# Patient Record
Sex: FEMALE | Race: BLACK OR AFRICAN AMERICAN | NOT HISPANIC OR LATINO | Employment: UNEMPLOYED | URBAN - METROPOLITAN AREA
[De-identification: names, ages, dates, MRNs, and addresses within clinical notes are randomized per-mention and may not be internally consistent; named-entity substitution may affect disease eponyms.]

---

## 2017-01-03 ENCOUNTER — OFFICE VISIT (OUTPATIENT)
Dept: OBSTETRICS AND GYNECOLOGY | Facility: CLINIC | Age: 42
End: 2017-01-03
Payer: MEDICAID

## 2017-01-03 VITALS
WEIGHT: 233 LBS | SYSTOLIC BLOOD PRESSURE: 134 MMHG | BODY MASS INDEX: 38.82 KG/M2 | DIASTOLIC BLOOD PRESSURE: 70 MMHG | HEIGHT: 65 IN

## 2017-01-03 DIAGNOSIS — N90.89 VULVAR IRRITATION: Primary | ICD-10-CM

## 2017-01-03 DIAGNOSIS — N89.8 VAGINAL DISCHARGE: ICD-10-CM

## 2017-01-03 PROCEDURE — 99213 OFFICE O/P EST LOW 20 MIN: CPT | Mod: S$PBB,,, | Performed by: OBSTETRICS & GYNECOLOGY

## 2017-01-03 PROCEDURE — 99214 OFFICE O/P EST MOD 30 MIN: CPT | Mod: PBBFAC | Performed by: OBSTETRICS & GYNECOLOGY

## 2017-01-03 PROCEDURE — 87480 CANDIDA DNA DIR PROBE: CPT

## 2017-01-03 PROCEDURE — 99999 PR PBB SHADOW E&M-EST. PATIENT-LVL IV: CPT | Mod: PBBFAC,,, | Performed by: OBSTETRICS & GYNECOLOGY

## 2017-01-03 RX ORDER — CLOTRIMAZOLE AND BETAMETHASONE DIPROPIONATE 10; .64 MG/G; MG/G
CREAM TOPICAL
Qty: 45 G | Refills: 1 | Status: SHIPPED | OUTPATIENT
Start: 2017-01-03 | End: 2018-01-03

## 2017-01-03 NOTE — PROGRESS NOTES
CC:  Vulvar pain    HPI:  41 yro  with supracervical hysterectomy presents due to several months of worsening vulvar pain.  Pt states when the pain flares, it is like she is being poked with needles.          Physical Exam   Constitutional: She is oriented to person, place, and time. She appears well-developed and well-nourished. No distress.   HENT:   Head: Normocephalic and atraumatic.   Neck: Normal range of motion.   Cardiovascular: Normal rate.    Pulmonary/Chest: Effort normal.   Genitourinary:       Pelvic exam was performed with patient supine. No labial fusion. There is tenderness on the right labia. There is no rash, lesion or injury on the right labia. There is tenderness on the left labia. There is no rash, lesion or injury on the left labia. No erythema, tenderness or bleeding in the vagina. No foreign body in the vagina. No signs of injury around the vagina. Vaginal discharge found.   Neurological: She is alert and oriented to person, place, and time. No cranial nerve deficit. Coordination normal.   Skin: She is not diaphoretic.   Psychiatric: She has a normal mood and affect. Her behavior is normal. Judgment normal.     Assessment  Vulvar Irritation  Vaginal discharge    Plan  AFFIRM collected  Rx Lotrisone cream to affected area bid x 14 days

## 2017-01-03 NOTE — MR AVS SNAPSHOT
[facility]  [address], [address]  [place] [address]  Phone: [phone]                  Brissa Falcon   1/3/2017 1:50 PM   Office Visit    Description:  Female : 1975   Provider:  Duncan Steele MD   Department:  [facility]           Reason for Visit     Vaginal Discharge                To Do List           Goals (5 Years of Data)     None      [facility]     [facility] Care Line -  Assistance  Registered nurses in the [facility] provide clinical advisement, health education, appointment booking, and other advisory services.  Call for this free service at [phone].             Medications           Message regarding Medications     Verify the changes and/or additions to your medication regime listed below are the same as discussed with your clinician today.  If any of these changes or additions are incorrect, please notify your healthcare provider.             Verify that the below list of medications is an accurate representation of the medications you are currently taking.  If none reported, the list may be blank. If incorrect, please contact your healthcare provider. Carry this list with you in case of emergency.           Current Medications     alprazolam (XANAX) 0.5 MG tablet Take 0.5 mg by mouth 3 (three) times daily.    buPROPion (WELLBUTRIN XL) 150 MG TB24 tablet Take 150 mg by mouth every morning.    escitalopram oxalate (LEXAPRO) 20 MG tablet Take 20 mg by mouth once daily.    fluticasone (FLONASE) 50 mcg/actuation nasal spray     hydrochlorothiazide (HYDRODIURIL) 25 MG tablet Take 25 mg by mouth once daily.      metoprolol succinate (TOPROL-XL) 100 MG 24 hr tablet Take 50 mg by mouth 2 (two) times daily.    metoprolol succinate (TOPROL-XL) 50 MG 24 hr tablet Take 50 mg by mouth 2 (two) times daily.    phentermine (ADIPEX-P) 37.5 mg tablet Take 18.75 mg by mouth once daily.    temazepam (RESTORIL) 7.5 MG Cap Take  "15 mg by mouth nightly as needed.    topiramate (TOPAMAX) 50 MG tablet Take 50 mg by mouth once daily.    cephALEXin (KEFLEX) 500 MG capsule     cetirizine (ZYRTEC) 10 MG tablet Take 10 mg by mouth once daily.    clonazePAM (KLONOPIN) 1 MG tablet Take 1 mg by mouth 2 (two) times daily.    doxycycline (MONODOX) 100 MG capsule     etodolac (LODINE) 200 MG Cap     gabapentin (NEURONTIN) 100 mg tablet Take 3 tablets (300 mg total) by mouth every evening. 100 mg hs x 3d. 200 mg hs x 3.then 300 mg hs.    hydrocodone-acetaminophen 10-325mg (NORCO)  mg Tab Take 1 tablet by mouth every 4 (four) hours as needed.    hydrOXYzine HCl (ATARAX) 25 MG tablet     ibuprofen (ADVIL,MOTRIN) 800 MG tablet Take 1 tablet (800 mg total) by mouth 3 (three) times daily.    levothyroxine (SYNTHROID) 25 MCG tablet     mupirocin (BACTROBAN) 2 % ointment Apply topically once daily. Apply to affected area as directed    omeprazole (PRILOSEC) 20 MG capsule Take 20 mg by mouth once daily.    ondansetron (ZOFRAN) 4 MG tablet Take 1 tablet (4 mg total) by mouth every 12 (twelve) hours as needed for Nausea.    sulfamethoxazole-trimethoprim 800-160mg (BACTRIM DS) 800-160 mg Tab            Clinical Reference Information           Vital Signs - Last Recorded  Most recent update: 1/3/2017  2:29 PM by Aury Guerra MA    BP Ht Wt LMP BMI    134/70 5' 5" (1.651 m) 105.7 kg (233 lb) 04/18/2015 38.77 kg/m2      Blood Pressure          Most Recent Value    BP  134/70      Allergies as of 1/3/2017     Percocet [Oxycodone-acetaminophen]    Augmentin [Amoxicillin-pot Clavulanate]      Immunizations Administered on Date of Encounter - 1/3/2017     None      "

## 2017-01-04 LAB
CANDIDA RRNA VAG QL PROBE: NEGATIVE
G VAGINALIS RRNA GENITAL QL PROBE: NEGATIVE
T VAGINALIS RRNA GENITAL QL PROBE: NEGATIVE

## 2017-09-30 ENCOUNTER — HOSPITAL ENCOUNTER (EMERGENCY)
Facility: HOSPITAL | Age: 42
Discharge: HOME | End: 2017-09-30
Attending: EMERGENCY MEDICINE
Payer: MEDICAID

## 2017-09-30 ENCOUNTER — NURSE TRIAGE (OUTPATIENT)
Dept: ADMINISTRATIVE | Facility: CLINIC | Age: 42
End: 2017-09-30

## 2017-09-30 VITALS
DIASTOLIC BLOOD PRESSURE: 69 MMHG | SYSTOLIC BLOOD PRESSURE: 109 MMHG | TEMPERATURE: 99 F | BODY MASS INDEX: 40.97 KG/M2 | HEART RATE: 75 BPM | HEIGHT: 64 IN | RESPIRATION RATE: 18 BRPM | WEIGHT: 240 LBS | OXYGEN SATURATION: 98 %

## 2017-09-30 DIAGNOSIS — R07.9 CHEST PAIN: ICD-10-CM

## 2017-09-30 DIAGNOSIS — E87.6 HYPOKALEMIA: Primary | ICD-10-CM

## 2017-09-30 LAB
BASOPHILS # BLD AUTO: 0.01 K/UL
BASOPHILS NFR BLD: 0.2 %
DIFFERENTIAL METHOD: NORMAL
EOSINOPHIL # BLD AUTO: 0 K/UL
EOSINOPHIL NFR BLD: 0.6 %
ERYTHROCYTE [DISTWIDTH] IN BLOOD BY AUTOMATED COUNT: 13.3 %
HCT VFR BLD AUTO: 39.2 %
HGB BLD-MCNC: 13.4 G/DL
LIPASE SERPL-CCNC: 21 U/L
LYMPHOCYTES # BLD AUTO: 1.1 K/UL
LYMPHOCYTES NFR BLD: 20.9 %
MCH RBC QN AUTO: 28.6 PG
MCHC RBC AUTO-ENTMCNC: 34.2 G/DL
MCV RBC AUTO: 84 FL
MONOCYTES # BLD AUTO: 0.5 K/UL
MONOCYTES NFR BLD: 10.1 %
NEUTROPHILS # BLD AUTO: 3.6 K/UL
NEUTROPHILS NFR BLD: 67.8 %
PLATELET # BLD AUTO: 201 K/UL
PMV BLD AUTO: 10 FL
RBC # BLD AUTO: 4.69 M/UL
WBC # BLD AUTO: 5.36 K/UL

## 2017-09-30 PROCEDURE — 63600175 PHARM REV CODE 636 W HCPCS: Performed by: EMERGENCY MEDICINE

## 2017-09-30 PROCEDURE — 99284 EMERGENCY DEPT VISIT MOD MDM: CPT | Mod: ,,, | Performed by: EMERGENCY MEDICINE

## 2017-09-30 PROCEDURE — 96361 HYDRATE IV INFUSION ADD-ON: CPT

## 2017-09-30 PROCEDURE — 93005 ELECTROCARDIOGRAM TRACING: CPT

## 2017-09-30 PROCEDURE — 96374 THER/PROPH/DIAG INJ IV PUSH: CPT

## 2017-09-30 PROCEDURE — 99284 EMERGENCY DEPT VISIT MOD MDM: CPT | Mod: 25

## 2017-09-30 PROCEDURE — 83690 ASSAY OF LIPASE: CPT

## 2017-09-30 PROCEDURE — 85025 COMPLETE CBC W/AUTO DIFF WBC: CPT

## 2017-09-30 PROCEDURE — 93010 ELECTROCARDIOGRAM REPORT: CPT | Mod: ,,, | Performed by: INTERNAL MEDICINE

## 2017-09-30 PROCEDURE — 25000003 PHARM REV CODE 250: Performed by: EMERGENCY MEDICINE

## 2017-09-30 RX ORDER — POTASSIUM CHLORIDE 750 MG/1
20 TABLET, EXTENDED RELEASE ORAL DAILY
Qty: 6 TABLET | Refills: 0 | Status: SHIPPED | OUTPATIENT
Start: 2017-09-30 | End: 2017-10-03

## 2017-09-30 RX ORDER — FLUOXETINE HYDROCHLORIDE 20 MG/1
20 CAPSULE ORAL DAILY
COMMUNITY
End: 2021-10-15

## 2017-09-30 RX ORDER — BUTALBITAL, ACETAMINOPHEN AND CAFFEINE 300; 40; 50 MG/1; MG/1; MG/1
CAPSULE ORAL
COMMUNITY
Start: 2017-07-04 | End: 2021-10-15

## 2017-09-30 RX ORDER — TIZANIDINE 4 MG/1
4 TABLET ORAL NIGHTLY
COMMUNITY
Start: 2017-07-22 | End: 2017-12-13 | Stop reason: ALTCHOICE

## 2017-09-30 RX ORDER — POTASSIUM CHLORIDE 20 MEQ/1
60 TABLET, EXTENDED RELEASE ORAL
Status: COMPLETED | OUTPATIENT
Start: 2017-09-30 | End: 2017-09-30

## 2017-09-30 RX ORDER — BUPROPION HYDROCHLORIDE 300 MG/1
TABLET ORAL
COMMUNITY
Start: 2017-07-19 | End: 2021-10-15

## 2017-09-30 RX ORDER — KETOROLAC TROMETHAMINE 30 MG/ML
15 INJECTION, SOLUTION INTRAMUSCULAR; INTRAVENOUS
Status: COMPLETED | OUTPATIENT
Start: 2017-09-30 | End: 2017-09-30

## 2017-09-30 RX ADMIN — KETOROLAC TROMETHAMINE 15 MG: 30 INJECTION, SOLUTION INTRAMUSCULAR; INTRAVENOUS at 09:09

## 2017-09-30 RX ADMIN — POTASSIUM CHLORIDE 60 MEQ: 1500 TABLET, EXTENDED RELEASE ORAL at 10:09

## 2017-09-30 RX ADMIN — SODIUM CHLORIDE 1000 ML: 0.9 INJECTION, SOLUTION INTRAVENOUS at 09:09

## 2017-10-01 NOTE — ED NOTES
LOC: The patient is awake, alert and aware of environment with an appropriate affect, the patient is oriented x 3 and speaking appropriately.  APPEARANCE: Patient resting comfortably and in no acute distress, patient is clean and well groomed, patient's clothing is properly fastened.  SKIN: The skin is warm and dry, patient has normal skin turgor and moist mucus membranes, skin intact, no breakdown or brusing noted.  MUSKULOSKELETAL: Patient moving all extremities well, no obvious swelling or deformities noted.  RESPIRATORY: Airway is open and patent, respirations are spontaneous, patient has a normal effort and rate.   NEURO: No neuro deficits. Speech is clear.  PAIN: Patient complains of generalized pain particularly to her back, chest, neck, and head

## 2017-10-01 NOTE — ED PROVIDER NOTES
Encounter Date: 9/30/2017    SCRIBE #1 NOTE: I, Berna Harper, am scribing for, and in the presence of,  Dr. Holman. I have scribed the entire note.       History     Chief Complaint   Patient presents with    Generalized Body Aches     Pt reports generalized body aches with vomiting and diarrhea since yesterday.    Chest Pain     Pt reports chest pain that began today.     Time patient was seen by the provider: 9:07 PM      The patient is a 42 y.o. female with hx of: HTN, fibromyalgia, anxiety and depression that presents to the ED with multiple co-morbidities. Yesterday pt was reportedly vomiting and had diarrhea that stopped yesterday as well with associated generalized abdominal pain, which kept her up last night as since been relieved. This morning she woke up with a headache, neck and upper back pain. She also endorses rib pain when she coughs (dry) and mid-sternal chest pain that does not move to her left side and is worse when she sits up. Pt did have sick contact at home where her son had a stomach bug 3 days ago. She additionally endorses ear pain and tongue pain. Pt denies fever but felt warm, heavy lifting and change in diet.      The history is provided by the patient and medical records.     Review of patient's allergies indicates:   Allergen Reactions    Percocet [oxycodone-acetaminophen]     Augmentin [amoxicillin-pot clavulanate] Rash     Past Medical History:   Diagnosis Date    Allergy     Anxiety     Degenerative joint disease     Depression     Dry mouth     Fibromyalgia 1/24/2013    Hypertension     Vaginal delivery     x2     Past Surgical History:   Procedure Laterality Date    HYSTERECTOMY      MYOMECTOMY  2009    TUBAL LIGATION       Family History   Problem Relation Age of Onset    Diabetes Mellitus Mother     JOAQUÍN disease Mother     Lupus Neg Hx     Rheum arthritis Neg Hx     Thyroid disease Neg Hx      Social History   Substance Use Topics    Smoking status: Never  Smoker    Smokeless tobacco: Never Used    Alcohol use Yes      Comment: rarely     Review of Systems   Constitutional: Negative for activity change, appetite change, chills and fever.   HENT: Positive for ear pain.    Eyes: Negative for pain.   Respiratory: Positive for cough. Negative for shortness of breath.    Cardiovascular: Positive for chest pain.   Gastrointestinal: Positive for abdominal pain, diarrhea and vomiting.   Genitourinary: Negative for difficulty urinating, dysuria and frequency.   Musculoskeletal: Positive for back pain (upper) and neck pain.   Skin: Negative for rash.   Neurological: Positive for headaches.       Physical Exam     Initial Vitals [09/30/17 2056]   BP Pulse Resp Temp SpO2   129/71 80 17 98.8 °F (37.1 °C) 98 %      MAP       90.33         Physical Exam    Nursing note and vitals reviewed.  Constitutional: She appears well-developed and well-nourished. She is not diaphoretic. No distress.   HENT:   Head: Normocephalic and atraumatic.   Dry mucous membrane. TM's nml with no erythema   Eyes: EOM are normal. Pupils are equal, round, and reactive to light.   Neck: Normal range of motion. Neck supple. No JVD present.   No cervical lymphadenopathy    Cardiovascular: Normal rate, regular rhythm, normal heart sounds and intact distal pulses. Exam reveals no gallop and no friction rub.    No murmur heard.  Pulmonary/Chest: Breath sounds normal. No respiratory distress. She has no wheezes. She has no rhonchi. She has no rales.   Abdominal: Soft. She exhibits no distension. There is no rebound and no guarding.   Mild RUQ and epigastric tenderness. No phillip's sign   Musculoskeletal: Normal range of motion. She exhibits no edema.   Bilateral trapezial tenderness and upper back tenderness   Lymphadenopathy:     She has no cervical adenopathy.   Neurological: She is alert and oriented to person, place, and time. She has normal strength. No cranial nerve deficit or sensory deficit.   Skin: Skin  is warm and dry. No rash noted.         ED Course   Procedures  Labs Reviewed   CBC W/ AUTO DIFFERENTIAL   LIPASE     EKG Readings: (Independently Interpreted)   Normal sinus rhythm at 76. Nml axis and intervals with no ischemic changes.          Medical Decision Making:   History:   Old Medical Records: I decided to obtain old medical records.  Initial Assessment:   Urgent evaluation of 42 y.o. female presenting with abdominal pain and n/v. My initial differential diagnoses include but are not limited to gastroenteritis, SANJU, electrolyte derangement. Will treat with IV fluids, check labs and reevaluate.    11:27 PM  Labs reviewed and significant for potassium of 2.6. ( IStat results in nursing note) Pt treated with 60 milliequivalent of KCl. She reports significant improvement after IV fluids. Will discharge with short prescription of oral potassium supplements and instructions for potassium containing foods.  Independently Interpreted Test(s):   I have ordered and independently interpreted EKG Reading(s) - see prior notes  Clinical Tests:   Lab Tests: Reviewed and Ordered  Medical Tests: Reviewed and Ordered            Scribe Attestation:   Scribe #1: I performed the above scribed service and the documentation accurately describes the services I performed. I attest to the accuracy of the note.    Attending Attestation:           Physician Attestation for Scribe:  Physician Attestation Statement for Scribe #1: I, Dr. Holman, reviewed documentation, as scribed by Berna Harper in my presence, and it is both accurate and complete.                 ED Course      Clinical Impression:   The primary encounter diagnosis was Hypokalemia. A diagnosis of Chest pain was also pertinent to this visit.    Disposition:   Disposition: Discharged  Condition: Stable                        Deborah Holman MD  10/02/17 0426

## 2017-10-01 NOTE — ED NOTES
NA+     139  K+       2.6  CL        96  CA       1.09  TCO2   29  GLU      121  BUN       8  Creat     0.8  HCT      41

## 2017-10-01 NOTE — ED TRIAGE NOTES
Pt to ER with c/o pain to mid-back, chest, head, and neck started at approximately 7am today. Pt reports vomiting and diarrhea yesterday but none today. Pt not sure of fever because she doesn't own a thermometer.

## 2017-10-01 NOTE — ED NOTES
FOCAL ASSESSMENT: pt voiced having generalized body aches.worse in the neck with headache. L rib pain that is worse when she breathes in.

## 2017-10-01 NOTE — TELEPHONE ENCOUNTER
"  Reason for Disposition   Patient sounds very sick or weak to the triager    Answer Assessment - Initial Assessment Questions  1. ONSET: "When did the pain begin?"       This am  2. LOCATION: "Where does it hurt?"       Neck,headache,arm stiffness,and total body aches.  3. PATTERN "Does the pain come and go, or has it been constant since it started?"       constant  4. SEVERITY: "How bad is the pain?"  (Scale 1-10; or mild, moderate, severe)    - MILD (1-3): doesn't interfere with normal activities     - MODERATE (4-7): interferes with normal activities or awakens from sleep     - SEVERE (8-10):  excruciating pain, unable to do any normal activities       10  5. RADIATION: "Does the pain go anywhere else, shoot into your arms?"      Localized not radiating  6. CORD SYMPTOMS: "Any weakness or numbness of the arms or legs?"      no  7. CAUSE: "What do you think is causing the neck pain?"      unsure  8. NECK OVERUSE: "Any recent activities that involved turning or twisting the neck?"      no  9. OTHER SYMPTOMS: "Do you have any other symptoms?" (e.g., headache, fever, chest pain, difficulty breathing, neck swelling)      Dizziness.  10. PREGNANCY: "Is there any chance you are pregnant?" "When was your last menstrual period?"        No,hyst    Protocols used: ST NECK PAIN OR [id number]    "

## 2017-10-02 LAB
BUN SERPL-MCNC: 8 MG/DL (ref 6–30)
CHLORIDE SERPL-SCNC: 96 MMOL/L (ref 95–110)
CREAT SERPL-MCNC: 0.8 MG/DL (ref 0.5–1.4)
GLUCOSE SERPL-MCNC: 121 MG/DL (ref 70–110)
HCT VFR BLD CALC: 41 %PCV (ref 36–54)
POC IONIZED CALCIUM: 1.09 MMOL/L (ref 1.06–1.42)
POC TCO2 (MEASURED): 29 MMOL/L (ref 23–29)
POTASSIUM BLD-SCNC: 2.6 MMOL/L (ref 3.5–5.1)
SAMPLE: ABNORMAL
SODIUM BLD-SCNC: 139 MMOL/L (ref 136–145)

## 2018-02-23 ENCOUNTER — OFFICE VISIT (OUTPATIENT)
Dept: OBSTETRICS AND GYNECOLOGY | Facility: CLINIC | Age: 43
End: 2018-02-23
Payer: MEDICAID

## 2018-02-23 VITALS
BODY MASS INDEX: 41.66 KG/M2 | HEIGHT: 64 IN | SYSTOLIC BLOOD PRESSURE: 126 MMHG | DIASTOLIC BLOOD PRESSURE: 70 MMHG | WEIGHT: 244 LBS

## 2018-02-23 DIAGNOSIS — Z01.419 GYNECOLOGIC EXAM NORMAL: Primary | ICD-10-CM

## 2018-02-23 DIAGNOSIS — N89.8 VAGINAL DISCHARGE: ICD-10-CM

## 2018-02-23 PROCEDURE — 99396 PREV VISIT EST AGE 40-64: CPT | Mod: S$PBB,,, | Performed by: OBSTETRICS & GYNECOLOGY

## 2018-02-23 PROCEDURE — 3008F BODY MASS INDEX DOCD: CPT | Mod: ,,, | Performed by: OBSTETRICS & GYNECOLOGY

## 2018-02-23 PROCEDURE — 87480 CANDIDA DNA DIR PROBE: CPT

## 2018-02-23 PROCEDURE — 99999 PR PBB SHADOW E&M-EST. PATIENT-LVL V: CPT | Mod: PBBFAC,,, | Performed by: OBSTETRICS & GYNECOLOGY

## 2018-02-23 PROCEDURE — 99215 OFFICE O/P EST HI 40 MIN: CPT | Mod: PBBFAC | Performed by: OBSTETRICS & GYNECOLOGY

## 2018-02-24 NOTE — PROGRESS NOTES
Subjective:       Patient ID: Brissa Falcon is a 42 y.o. female.    Chief Complaint:  Gynecologic Exam      History of Present Illness  HPI  Annual Exam-Premenopausal  Patient presents for annual exam. The patient has no complaints today. The patient is sexually active. GYN screening history: last pap: approximate date 2016 and was normal. The patient wears seatbelts: yes. The patient participates in regular exercise: no. Has the patient ever been transfused or tattooed?: not asked. The patient reports that there is not domestic violence in her life.    Pt does admit a vaginal discharge but denies any pain or odor.  Pt has not tried any OTC med for relief.    Pt states she had MMG last month and was normal.                GYN & OB History  Patient's last menstrual period was 2015.   Date of Last Pap: 6/10/2016    OB History    Para Term  AB Living   2         2   SAB TAB Ectopic Multiple Live Births                  # Outcome Date GA Lbr Cristian/2nd Weight Sex Delivery Anes PTL Lv   2       Vag-Spont      1       Vag-Spont             Review of Systems  Review of Systems   Constitutional: Negative.    Respiratory: Negative.    Cardiovascular: Negative.    Gastrointestinal: Negative.    Genitourinary: Negative.    Musculoskeletal: Negative.    Hematological: Negative.    Psychiatric/Behavioral: Negative.    Breast: negative.            Objective:    Physical Exam:   Constitutional: She is oriented to person, place, and time. She appears well-developed and well-nourished. No distress.    HENT:   Head: Normocephalic and atraumatic.     Neck: Normal range of motion. No thyromegaly present.    Cardiovascular: Normal rate.     Pulmonary/Chest: Effort normal. No respiratory distress. Right breast exhibits no inverted nipple, no mass, no nipple discharge, no skin change, no tenderness, presence, no bleeding and no swelling. Left breast exhibits no inverted nipple, no mass, no nipple  discharge, no skin change, no tenderness, presence, no bleeding and no swelling.        Abdominal: Soft. She exhibits no distension and no mass. There is no tenderness. There is no rebound and no guarding.     Genitourinary: Vagina normal and uterus normal. Pelvic exam was performed with patient supine. There is no rash, tenderness, lesion or injury on the right labia. There is no rash, tenderness, lesion or injury on the left labia. Cervix is normal. Right adnexum displays no mass, no tenderness and no fullness. Left adnexum displays no mass, no tenderness and no fullness. Labial bartholins normal.          Musculoskeletal: Normal range of motion and moves all extremeties. She exhibits no tenderness.       Neurological: She is alert and oriented to person, place, and time. No cranial nerve deficit. Coordination normal.    Skin: She is not diaphoretic.    Psychiatric: She has a normal mood and affect. Her behavior is normal. Judgment and thought content normal.          Assessment:        1. Gynecologic exam normal    2. Vaginal discharge               Plan:      1.  Pt states she is up to date with MMG  2.  Pap not indicated at this time  3.  AFFIRM collected

## 2021-04-16 ENCOUNTER — PATIENT MESSAGE (OUTPATIENT)
Dept: RESEARCH | Facility: HOSPITAL | Age: 46
End: 2021-04-16

## 2021-10-22 PROBLEM — R19.4 CHANGE IN BOWEL HABITS: Status: ACTIVE | Noted: 2021-10-22

## 2021-10-22 PROBLEM — R19.4 CHANGE IN BOWEL HABIT: Status: ACTIVE | Noted: 2021-10-22
